# Patient Record
Sex: MALE | Race: WHITE | URBAN - METROPOLITAN AREA
[De-identification: names, ages, dates, MRNs, and addresses within clinical notes are randomized per-mention and may not be internally consistent; named-entity substitution may affect disease eponyms.]

---

## 2022-03-17 ENCOUNTER — OFFICE VISIT (OUTPATIENT)
Dept: URBAN - METROPOLITAN AREA CLINIC 71 | Facility: CLINIC | Age: 87
End: 2022-03-17
Payer: MEDICARE

## 2022-03-17 DIAGNOSIS — Z96.1 PRESENCE OF INTRAOCULAR LENS: ICD-10-CM

## 2022-03-17 DIAGNOSIS — H04.123 DRY EYE SYNDROME OF BILATERAL LACRIMAL GLANDS: Primary | ICD-10-CM

## 2022-03-17 PROCEDURE — 99204 OFFICE O/P NEW MOD 45 MIN: CPT

## 2022-03-17 RX ORDER — ASPIRIN 81 MG/1
81 MG TABLET, COATED ORAL
Qty: 0 | Refills: 0 | Status: ACTIVE
Start: 2022-03-17

## 2022-03-17 RX ORDER — TAMSULOSIN HYDROCHLORIDE 0.4 MG/1
0.4 MG CAPSULE ORAL
Qty: 0 | Refills: 0 | Status: ACTIVE
Start: 2022-03-17

## 2022-03-17 RX ORDER — METOPROLOL TARTRATE 37.5 MG/1
37.5 MG TABLET, FILM COATED ORAL
Qty: 0 | Refills: 0 | Status: ACTIVE
Start: 2022-03-17

## 2022-03-17 RX ORDER — ATORVASTATIN CALCIUM 20 MG/1
20 MG TABLET, FILM COATED ORAL
Qty: 0 | Refills: 0 | Status: ACTIVE
Start: 2022-03-17

## 2022-03-17 RX ORDER — FINASTERIDE 5 MG/1
5 MG TABLET, FILM COATED ORAL
Qty: 0 | Refills: 0 | Status: ACTIVE
Start: 2022-03-17

## 2022-03-17 RX ORDER — CLOPIDOGREL 75 MG/1
75 MG TABLET, FILM COATED ORAL
Qty: 0 | Refills: 0 | Status: ACTIVE
Start: 2022-03-17

## 2022-03-17 ASSESSMENT — INTRAOCULAR PRESSURE
OS: 11
OD: 12

## 2022-03-17 NOTE — IMPRESSION/PLAN
Impression: Dry eye syndrome of bilateral lacrimal glands: H04.123. Recommended Refresh PM taylor or [employer]. Plan: Recommend using OTC artificial tears 2-4x daily w/ emphasis on QAM and QHS doses. Discussed possible improvement with thicker gel or ointment QHS. Recommend warm compress w/ lid massage. If symptoms continue/worsen will consider prescription drug therapy.

## 2023-02-10 ENCOUNTER — OFFICE VISIT (OUTPATIENT)
Dept: URBAN - METROPOLITAN AREA CLINIC 71 | Facility: CLINIC | Age: 88
End: 2023-02-10
Payer: MEDICARE

## 2023-02-10 DIAGNOSIS — Z96.1 PRESENCE OF INTRAOCULAR LENS: ICD-10-CM

## 2023-02-10 DIAGNOSIS — H52.4 PRESBYOPIA: ICD-10-CM

## 2023-02-10 DIAGNOSIS — H04.123 DRY EYE SYNDROME OF BILATERAL LACRIMAL GLANDS: ICD-10-CM

## 2023-02-10 DIAGNOSIS — H35.372 PUCKERING OF MACULA, LEFT EYE: Primary | ICD-10-CM

## 2023-02-10 PROCEDURE — 99213 OFFICE O/P EST LOW 20 MIN: CPT

## 2023-02-10 PROCEDURE — 92134 CPTRZ OPH DX IMG PST SGM RTA: CPT

## 2023-02-10 ASSESSMENT — INTRAOCULAR PRESSURE
OS: 7
OD: 9

## 2023-02-10 ASSESSMENT — VISUAL ACUITY
OS: 20/25
OD: 20/20

## 2023-02-10 NOTE — IMPRESSION/PLAN
Impression: Puckering of macula, left eye: H35.372. Plan: Apprised patient of findings and discussed condition in detail. No treatment warranted at this time d/t mild stage. Informed patient of changes in vision that may indicate progression (distortion, straight edges of objects appearing warped or bent, etc). Cont to monitor for changes with yearly DFE and Mac OCT. Directed patient to RTC prn if experiencing visual changes as described.

## 2023-02-10 NOTE — IMPRESSION/PLAN
Impression: Presbyopia: H52.4. Plan: Issued new spec Rx, discussed changes and possible adaptation period.  

RTC 1yr or prn

## 2023-02-10 NOTE — IMPRESSION/PLAN
Impression: Dry eye syndrome of bilateral lacrimal glands: H04.123. Plan: Disp systane/refresh samples Use QID OU or prn, especially when doing prolonged reading or computer work